# Patient Record
Sex: FEMALE | Race: WHITE | Employment: UNEMPLOYED | URBAN - METROPOLITAN AREA
[De-identification: names, ages, dates, MRNs, and addresses within clinical notes are randomized per-mention and may not be internally consistent; named-entity substitution may affect disease eponyms.]

---

## 2022-03-19 PROBLEM — C54.1 PAPILLARY SEROUS ENDOMETRIAL ADENOCARCINOMA (HCC): Status: ACTIVE | Noted: 2021-09-13

## 2023-06-08 ENCOUNTER — OFFICE VISIT (OUTPATIENT)
Age: 83
End: 2023-06-08
Payer: MEDICARE

## 2023-06-08 VITALS
DIASTOLIC BLOOD PRESSURE: 77 MMHG | HEART RATE: 61 BPM | BODY MASS INDEX: 28.57 KG/M2 | HEIGHT: 65 IN | SYSTOLIC BLOOD PRESSURE: 128 MMHG

## 2023-06-08 DIAGNOSIS — C54.1 PAPILLARY SEROUS ENDOMETRIAL ADENOCARCINOMA (HCC): Primary | ICD-10-CM

## 2023-06-08 PROCEDURE — 99213 OFFICE O/P EST LOW 20 MIN: CPT | Performed by: OBSTETRICS & GYNECOLOGY

## 2023-06-08 PROCEDURE — 1090F PRES/ABSN URINE INCON ASSESS: CPT | Performed by: OBSTETRICS & GYNECOLOGY

## 2023-06-08 PROCEDURE — G8419 CALC BMI OUT NRM PARAM NOF/U: HCPCS | Performed by: OBSTETRICS & GYNECOLOGY

## 2023-06-08 PROCEDURE — 1036F TOBACCO NON-USER: CPT | Performed by: OBSTETRICS & GYNECOLOGY

## 2023-06-08 PROCEDURE — 1123F ACP DISCUSS/DSCN MKR DOCD: CPT | Performed by: OBSTETRICS & GYNECOLOGY

## 2023-06-08 PROCEDURE — G8400 PT W/DXA NO RESULTS DOC: HCPCS | Performed by: OBSTETRICS & GYNECOLOGY

## 2023-06-08 PROCEDURE — G8427 DOCREV CUR MEDS BY ELIG CLIN: HCPCS | Performed by: OBSTETRICS & GYNECOLOGY

## 2023-06-08 NOTE — PROGRESS NOTES
[address] 
[phone] Patient: Laly Gardiner MRN: [id number] VGN:85/5/0819 My Medications STOP taking these medications   
 amLODIPine 5 mg tablet Commonly known as:  Fred Rankin TAKE these medications as instructed Instructions Each Dose to Equal  
 Morning Noon Evening Bedtime  
 acetaminophen 500 mg tablet Commonly known as:  TYLENOL Your last dose was: Your next dose is: Take 500 mg by mouth every six (6) hours as needed for Pain. 500 mg  
    
   
   
   
  
 carbidopa-levodopa  mg per tablet Commonly known as:  SINEMET Your last dose was: Your next dose is: Take 2 Tabs by mouth four (4) times daily. 2 Tab  
    
   
   
   
  
 carvedilol 3.125 mg tablet Commonly known as:  Mercedes Carlson Your last dose was: Your next dose is: Take 1 Tab by mouth two (2) times daily (with meals). 3.125 mg  
    
   
   
   
  
 docusate sodium 50 mg capsule Commonly known as:  Vijaya Wilkins Your last dose was: Your next dose is: Take 50 mg by mouth daily. 50 mg  
    
   
   
   
  
 hydrALAZINE 25 mg tablet Commonly known as:  APRESOLINE Your last dose was: Your next dose is: TAKE 1 TABLET BY MOUTH TWICE DAILY losartan 25 mg tablet Commonly known as:  COZAAR Start taking on:  12/14/2017 Your last dose was: Your next dose is: Take 0.5 Tabs by mouth daily. 12.5 mg  
    
   
   
   
  
 lovastatin 20 mg tablet Commonly known as:  MEVACOR Your last dose was: Your next dose is: TAKE 1 TABLET BY MOUTH EVERY DAY  
     
   
   
   
  
 multivitamin with iron tablet Your last dose was: Your next dose is: Take 1 Tab by mouth daily. [employer] 1 Tab [address] [place] [address] Mathias Moritz 026, 0804 [facility]  P [phone]  F [phone]    Office Note  Patient ID:  Name:  Daniel Medina  MRN:  [id number]  :  1940/83 y.o. Date:  2023      HISTORY OF PRESENT ILLNESS:  Daniel Medina is a 79 y.o.  postmenopausal female who is an established patient being seen for a diagnosis of stage IB uterine papillary serous carcinoma. She underwent robotic-assisted TLH, BSO, SPLND, omentectomy on 21. Based upon her pathology she was recommended adjuvant chemotherapy. She received adjuvant Taxol/Carboplatin chemotherapy using weekly dosing. She completed 3 cycles and did not do well with the last two cycles. She did not want to continue with chemotherapy. She was referred to radiation oncology for consultation. She completed vaginal brachytherapy in 2022. She presents today for follow-up. She is without complaints. She denies any vaginal bleeding or discharge, any pelvic or abdominal pain, or any changes in her bowel or bladder habits. ROS:   and GI review:  Negative  Cardiopulmonary review:  Negative   Musculoskeletal:  Negative     A comprehensive review of systems was negative except for that written in the History of Present Illness. 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     PMH:  Past Medical History:   Diagnosis Date    Aneurysm ([address]     thoracic; being monitored w/ CT exams    Atrial fibrillation ([address]     ELIQUIS    CAD (coronary artery disease)     Echo 2017: EF 50%, grade I dystolic dysfunction, mild MR, mild pulmonary HTN    Cancer ([address] 2021    ENDOMETRIAL    Hyperlipemia     Hypertension     Hypothyroidism       PSH:  Past Surgical History:   Procedure Laterality Date    CARDIAC CATHETERIZATION      CARDIOVERSION X2    CARDIAC CATHETERIZATION  2020 ondansetron hcl 4 mg tablet Commonly known as:  Li Hu Your last dose was: Your next dose is: Take 4 mg by mouth every eight (8) hours as needed for Nausea. 4 mg OTHER Your last dose was: Your next dose is:    
   
   
 Rollator Walker with seat icd [address] Where to Get Your Medications Information on where to get these meds will be given to you by the nurse or doctor. ! Ask your nurse or doctor about these medications  
  carvedilol 3.125 mg tablet  
 losartan 25 mg tablet

## 2023-06-08 NOTE — PROGRESS NOTES
Three month check up for history of endometrial cancer. Pt states no abnormal spotting, bleeding or pain. 1. Have you been to the ER, urgent care clinic since your last visit? Hospitalized since your last visit?no    2. Have you seen or consulted any other health care providers outside of the [address] [place] [address] since your last visit? Include any pap smears or colon screening.    no

## 2023-06-21 NOTE — PERIOP NOTE
[address] INSTRUCTIONS    Surgery Date:   6/23/23    Your surgeon's office or [facility] staff will call you between 4 PM- 8 PM the day before surgery with your arrival time. If your surgery is on a Monday, you will receive a call the preceding Friday. Please report to [address] Patient Access/Admitting on the 1st floor. Bring your insurance card, photo identification, and any copayment ( if applicable). If you are going home the same day of your surgery, you must have a responsible adult to drive you home. You need to have a responsible adult to stay with you the first 24 hours after surgery and you should not drive a car for 24 hours following your surgery. Nothing to eat or drink after midnight the night before surgery. This includes no water, gum, mints, coffee, juice, etc.  Please note special instructions, if applicable, below for medications. Do NOT drink alcohol or smoke 24 hours before surgery. STOP smoking for 14 days prior as it helps with breathing and healing after surgery. If you are being admitted to the hospital, please leave personal belongings/luggage in your car until you have an assigned hospital room number. Please wear comfortable clothes. Wear your glasses instead of contacts. We ask that all money, jewelry and valuables be left at home. Wear no make up, particularly mascara, the day of surgery. All body piercings, rings, and jewelry need to be removed and left at home. Please remove any nail polish or artificial nails from your fingernails. Please wear your hair loose or down. Please no pony-tails, buns, or any metal hair accessories. If you shower the morning of surgery, please do not apply any lotions or powders afterwards. You may wear deodorant, unless having breast surgery. Do not shave any body area within 24 hours of your surgery. Please follow all instructions to avoid any potential surgical cancellation.   Should your physical condition change,

## 2023-06-22 ENCOUNTER — ANESTHESIA EVENT (OUTPATIENT)
Facility: HOSPITAL | Age: 83
End: 2023-06-22
Payer: MEDICARE

## 2023-06-22 NOTE — H&P
reviewed. The patient has been examined. There have been no significant clinical changes since the completion of the originally dated History and Physical.  Patient identified by surgeon; surgical site was confirmed by patient and surgeon. Planned procedure again discussed. Questions invited and answered. Surgical consent signed by patient. Patient wishes to proceed with planned procedure.     Jose Rafael Todd MD  June 23, 2023  7:24 AM

## 2023-06-23 ENCOUNTER — ANESTHESIA (OUTPATIENT)
Facility: HOSPITAL | Age: 83
End: 2023-06-23
Payer: MEDICARE

## 2023-06-23 ENCOUNTER — HOSPITAL ENCOUNTER (OUTPATIENT)
Facility: HOSPITAL | Age: 83
Setting detail: OUTPATIENT SURGERY
Discharge: HOME | End: 2023-06-23
Attending: OBSTETRICS & GYNECOLOGY | Admitting: OBSTETRICS & GYNECOLOGY
Payer: MEDICARE

## 2023-06-23 VITALS
SYSTOLIC BLOOD PRESSURE: 119 MMHG | TEMPERATURE: 97.5 F | HEART RATE: 61 BPM | DIASTOLIC BLOOD PRESSURE: 71 MMHG | RESPIRATION RATE: 15 BRPM | WEIGHT: 163 LBS | HEIGHT: 65 IN | BODY MASS INDEX: 27.16 KG/M2 | OXYGEN SATURATION: 98 %

## 2023-06-23 PROCEDURE — 3600000002 HC SURGERY LEVEL 2 BASE: Performed by: OBSTETRICS & GYNECOLOGY

## 2023-06-23 PROCEDURE — 7100000010 HC PHASE II RECOVERY - FIRST 15 MIN: Performed by: OBSTETRICS & GYNECOLOGY

## 2023-06-23 PROCEDURE — 2709999900 HC NON-CHARGEABLE SUPPLY: Performed by: OBSTETRICS & GYNECOLOGY

## 2023-06-23 PROCEDURE — 3700000001 HC ADD 15 MINUTES (ANESTHESIA): Performed by: OBSTETRICS & GYNECOLOGY

## 2023-06-23 PROCEDURE — 7100000000 HC PACU RECOVERY - FIRST 15 MIN: Performed by: OBSTETRICS & GYNECOLOGY

## 2023-06-23 PROCEDURE — 2580000003 HC RX 258: Performed by: PHYSICIAN ASSISTANT

## 2023-06-23 PROCEDURE — 7100000011 HC PHASE II RECOVERY - ADDTL 15 MIN: Performed by: OBSTETRICS & GYNECOLOGY

## 2023-06-23 PROCEDURE — 3600000012 HC SURGERY LEVEL 2 ADDTL 15MIN: Performed by: OBSTETRICS & GYNECOLOGY

## 2023-06-23 PROCEDURE — 6360000002 HC RX W HCPCS: Performed by: PHYSICIAN ASSISTANT

## 2023-06-23 PROCEDURE — 2500000003 HC RX 250 WO HCPCS: Performed by: OBSTETRICS & GYNECOLOGY

## 2023-06-23 PROCEDURE — 6370000000 HC RX 637 (ALT 250 FOR IP): Performed by: ANESTHESIOLOGY

## 2023-06-23 PROCEDURE — 2580000003 HC RX 258: Performed by: ANESTHESIOLOGY

## 2023-06-23 PROCEDURE — 6360000002 HC RX W HCPCS

## 2023-06-23 PROCEDURE — 3700000000 HC ANESTHESIA ATTENDED CARE: Performed by: OBSTETRICS & GYNECOLOGY

## 2023-06-23 PROCEDURE — 2500000003 HC RX 250 WO HCPCS

## 2023-06-23 PROCEDURE — 7100000001 HC PACU RECOVERY - ADDTL 15 MIN: Performed by: OBSTETRICS & GYNECOLOGY

## 2023-06-23 RX ORDER — ONDANSETRON 2 MG/ML
4 INJECTION INTRAMUSCULAR; INTRAVENOUS
Status: DISCONTINUED | OUTPATIENT
Start: 2023-06-23 | End: 2023-06-23 | Stop reason: HOSPADM

## 2023-06-23 RX ORDER — SODIUM CHLORIDE 0.9 % (FLUSH) 0.9 %
5-40 SYRINGE (ML) INJECTION EVERY 12 HOURS SCHEDULED
Status: DISCONTINUED | OUTPATIENT
Start: 2023-06-23 | End: 2023-06-23 | Stop reason: HOSPADM

## 2023-06-23 RX ORDER — SODIUM CHLORIDE 9 MG/ML
INJECTION, SOLUTION INTRAVENOUS PRN
Status: DISCONTINUED | OUTPATIENT
Start: 2023-06-23 | End: 2023-06-23 | Stop reason: HOSPADM

## 2023-06-23 RX ORDER — LIDOCAINE HYDROCHLORIDE 10 MG/ML
1 INJECTION, SOLUTION EPIDURAL; INFILTRATION; INTRACAUDAL; PERINEURAL
Status: DISCONTINUED | OUTPATIENT
Start: 2023-06-23 | End: 2023-06-23 | Stop reason: HOSPADM

## 2023-06-23 RX ORDER — LIDOCAINE HYDROCHLORIDE 20 MG/ML
INJECTION, SOLUTION EPIDURAL; INFILTRATION; INTRACAUDAL; PERINEURAL PRN
Status: DISCONTINUED | OUTPATIENT
Start: 2023-06-23 | End: 2023-06-23 | Stop reason: SDUPTHER

## 2023-06-23 RX ORDER — OXYCODONE HYDROCHLORIDE 5 MG/1
5 TABLET ORAL
Status: DISCONTINUED | OUTPATIENT
Start: 2023-06-23 | End: 2023-06-23 | Stop reason: HOSPADM

## 2023-06-23 RX ORDER — ACETAMINOPHEN 500 MG
1000 TABLET ORAL ONCE
Status: DISCONTINUED | OUTPATIENT
Start: 2023-06-23 | End: 2023-06-23 | Stop reason: HOSPADM

## 2023-06-23 RX ORDER — MIDAZOLAM HYDROCHLORIDE 2 MG/2ML
2 INJECTION, SOLUTION INTRAMUSCULAR; INTRAVENOUS
Status: DISCONTINUED | OUTPATIENT
Start: 2023-06-23 | End: 2023-06-23 | Stop reason: HOSPADM

## 2023-06-23 RX ORDER — ONDANSETRON 2 MG/ML
INJECTION INTRAMUSCULAR; INTRAVENOUS PRN
Status: DISCONTINUED | OUTPATIENT
Start: 2023-06-23 | End: 2023-06-23 | Stop reason: SDUPTHER

## 2023-06-23 RX ORDER — FENTANYL CITRATE 50 UG/ML
INJECTION, SOLUTION INTRAMUSCULAR; INTRAVENOUS PRN
Status: DISCONTINUED | OUTPATIENT
Start: 2023-06-23 | End: 2023-06-23 | Stop reason: SDUPTHER

## 2023-06-23 RX ORDER — HYDROMORPHONE HYDROCHLORIDE 1 MG/ML
0.5 INJECTION, SOLUTION INTRAMUSCULAR; INTRAVENOUS; SUBCUTANEOUS EVERY 5 MIN PRN
Status: DISCONTINUED | OUTPATIENT
Start: 2023-06-23 | End: 2023-06-23 | Stop reason: HOSPADM

## 2023-06-23 RX ORDER — FENTANYL CITRATE 50 UG/ML
100 INJECTION, SOLUTION INTRAMUSCULAR; INTRAVENOUS
Status: DISCONTINUED | OUTPATIENT
Start: 2023-06-23 | End: 2023-06-23 | Stop reason: HOSPADM

## 2023-06-23 RX ORDER — SODIUM CHLORIDE 0.9 % (FLUSH) 0.9 %
5-40 SYRINGE (ML) INJECTION PRN
Status: DISCONTINUED | OUTPATIENT
Start: 2023-06-23 | End: 2023-06-23 | Stop reason: HOSPADM

## 2023-06-23 RX ORDER — PROCHLORPERAZINE EDISYLATE 5 MG/ML
5 INJECTION INTRAMUSCULAR; INTRAVENOUS
Status: DISCONTINUED | OUTPATIENT
Start: 2023-06-23 | End: 2023-06-23 | Stop reason: HOSPADM

## 2023-06-23 RX ORDER — SODIUM CHLORIDE, SODIUM LACTATE, POTASSIUM CHLORIDE, CALCIUM CHLORIDE 600; 310; 30; 20 MG/100ML; MG/100ML; MG/100ML; MG/100ML
INJECTION, SOLUTION INTRAVENOUS CONTINUOUS
Status: DISCONTINUED | OUTPATIENT
Start: 2023-06-23 | End: 2023-06-23 | Stop reason: HOSPADM

## 2023-06-23 RX ORDER — FENTANYL CITRATE 50 UG/ML
25 INJECTION, SOLUTION INTRAMUSCULAR; INTRAVENOUS EVERY 5 MIN PRN
Status: DISCONTINUED | OUTPATIENT
Start: 2023-06-23 | End: 2023-06-23 | Stop reason: HOSPADM

## 2023-06-23 RX ORDER — PROPOFOL 10 MG/ML
INJECTION, EMULSION INTRAVENOUS PRN
Status: DISCONTINUED | OUTPATIENT
Start: 2023-06-23 | End: 2023-06-23 | Stop reason: SDUPTHER

## 2023-06-23 RX ORDER — HYDRALAZINE HYDROCHLORIDE 20 MG/ML
10 INJECTION INTRAMUSCULAR; INTRAVENOUS
Status: DISCONTINUED | OUTPATIENT
Start: 2023-06-23 | End: 2023-06-23 | Stop reason: HOSPADM

## 2023-06-23 RX ADMIN — PROPOFOL 50 MCG/KG/MIN: 10 INJECTION, EMULSION INTRAVENOUS at 08:50

## 2023-06-23 RX ADMIN — ONDANSETRON HYDROCHLORIDE 4 MG: 2 INJECTION, SOLUTION INTRAMUSCULAR; INTRAVENOUS at 09:08

## 2023-06-23 RX ADMIN — PROPOFOL 40 MG: 10 INJECTION, EMULSION INTRAVENOUS at 08:50

## 2023-06-23 RX ADMIN — SODIUM CHLORIDE, POTASSIUM CHLORIDE, SODIUM LACTATE AND CALCIUM CHLORIDE: 600; 310; 30; 20 INJECTION, SOLUTION INTRAVENOUS at 08:07

## 2023-06-23 RX ADMIN — CEFOXITIN SODIUM 2000 MG: 2 POWDER, FOR SOLUTION INTRAVENOUS at 08:54

## 2023-06-23 RX ADMIN — FENTANYL CITRATE 25 MCG: 50 INJECTION, SOLUTION INTRAMUSCULAR; INTRAVENOUS at 09:00

## 2023-06-23 RX ADMIN — LIDOCAINE HYDROCHLORIDE 40 MG: 20 INJECTION, SOLUTION EPIDURAL; INFILTRATION; INTRACAUDAL; PERINEURAL at 08:50

## 2023-06-23 ASSESSMENT — PAIN - FUNCTIONAL ASSESSMENT: PAIN_FUNCTIONAL_ASSESSMENT: NONE - DENIES PAIN

## 2023-06-23 NOTE — BRIEF OP NOTE
Brief Postoperative Note      Patient: Jaleel Smith  YOB: 1940  MRN: [id number]    Date of Procedure: 6/23/2023    Pre-Op Diagnosis Codes:     * Endometrial cancer ([facility] [C54.1]    Post-Op Diagnosis: Post-Op Diagnosis Codes:     * Endometrial cancer ([facility] [C54.1]       Procedure(s):  REMOVAL INTRAVENOUS PORT    Surgeon(s):  Paty Bardales MD    Assistant:  * No surgical staff found *    Anesthesia: Monitor Anesthesia Care    Estimated Blood Loss (mL): Minimal    Complications: None    Specimens:   * No specimens in log *    Implants:  Implant Name Type Inv. Item Serial No.  Lot No. LRB No. Used Action   (HISTORICAL) PORT ATTACH CATH POWERPORT 8FR --  - SN/A   N/A [employer] [id number] Left 1 Explanted         Drains: * No LDAs found *    Findings: Port removed without difficulty.       Electronically signed by Fernando Onofre MD on 6/23/2023 at 9:13 AM

## 2023-06-23 NOTE — ANESTHESIA POSTPROCEDURE EVALUATION
Department of Anesthesiology  Postprocedure Note    Patient: Robert Giraldo  MRN: [id number]  YOB: 1940  Date of evaluation: 6/23/2023      Procedure Summary     Date: 06/23/23 Room / Location: [address] [place] [address] OR 20 / [address] [place] [address] OR    Anesthesia Start: 0845 Anesthesia Stop: 1273    Procedure: REMOVAL INTRAVENOUS PORT (Left: Chest) Diagnosis:       Endometrial cancer ([address]      (Endometrial cancer ([address] [C54.1])    Providers: Dexter Bauman MD Responsible Provider: Reggie Hu MD    Anesthesia Type: MAC ASA Status: 3          Anesthesia Type: MAC    Sindhu Phase I: Sindhu Score: 10    Sindhu Phase II: Sindhu Score: 10      Anesthesia Post Evaluation    Patient location during evaluation: PACU  Patient participation: complete - patient participated  Level of consciousness: awake  Airway patency: patent  Nausea & Vomiting: no nausea  Complications: no  Cardiovascular status: blood pressure returned to baseline and hemodynamically stable  Respiratory status: acceptable  Hydration status: stable  Multimodal analgesia pain management approach

## 2023-06-23 NOTE — OP NOTE
Gynecologic Oncology Operative Report    Robert Giraldo  6/23/2023    Pre-operative diagnosis:  Endometrial CA, no longer requires venous access for chemotherapy     Post-operative diagnosis:  Endometrial CA, no longer requires venous access for chemotherapy    Procedure:  Intravenous port removal    Surgeon:  Madhuri Atkinson MD    Assistant:  N/A    Anesthesia:  MAC, plus local    EBL:  Minimal    Implants:  Implant Name Type Inv. Item Serial No.  Lot No. LRB No. Used Action   (HISTORICAL) PORT ATTACH CATH POWERPORT 8FR --  - SN/A   N/A [employer]WD [id number] Left 1 Explanted     Complications:  None    Operative indications:  78 yo WF with history of endometrial cancer. She has no evidence of disease and wishes to have her IV port removed. Operative Findings:  Port removed without difficulty. Procedure in detail:  After the risks, benefits, indications, and alternatives of the procedure were discussed with the patient and informed consent was obtained, the patient was taken to the operating room. She was then correctly identified, administered IV sedation per anesthesia, and then prepped and draped in the supine position in the usual fashion. Her arms were also tucked at each side. The anatomy of the anterior thoracic wall was noted. 1% lidocaine without epinephrine was then injected along the previous incision overlying the port. A skin incision was then made transversely with a #15-blade scalpel in the same location. The incision was then carried down to the the level of the port with electro-cautery. The port was then grasped with a Kocher clamp and manipulated so that the Proline sutures securing it in place could be cut and removed. The port and catheter were then removed. The catheter tunnel was ligated with a figure-of-eight of 3-0 Vicryl suture. The port pocket was then irrigated and made hemostatic with electro-cautery. The incision was then closed in two layers.

## 2023-06-23 NOTE — PERIOP NOTE
Preoperative assessment completed by SHANON Burton. Patient verbalized understanding of surgical plan and consent reviewed. Allergies verified. NPO status confirmed. Intraoperative education given by RN. Opportunity given to ask questions.

## 2023-06-23 NOTE — ANESTHESIA PRE PROCEDURE
Department of Anesthesiology  Preprocedure Note       Name:  Eduardo Blackwood   Age:  79 y.o.  :  1940                                          MRN:  [id number]         Date:  2023      Surgeon: Joon Velazquez):  Ariela Rsos MD    Procedure: Procedure(s):  REMOVAL INTRAVENOUS PORT    Medications prior to admission:   Prior to Admission medications    Medication Sig Start Date End Date Taking?  Authorizing Provider   apixaban (ELIQUIS) 5 MG TABS tablet Take 1 tablet by mouth 2 times daily    Ar Automatic Reconciliation   atenolol (TENORMIN) 25 MG tablet Take 1 tablet by mouth daily    Ar Automatic Reconciliation   cyanocobalamin 500 MCG tablet Take 1 tablet by mouth daily    Ar Automatic Reconciliation   levothyroxine (SYNTHROID) 100 MCG tablet Take 88 mcg by mouth every morning (before breakfast)    Ar Automatic Reconciliation   pravastatin (PRAVACHOL) 20 MG tablet Take 1 tablet by mouth nightly    Ar Automatic Reconciliation   triamterene-hydroCHLOROthiazide (MAXZIDE-25) 37.5-25 MG per tablet Take 0.5 tablets by mouth daily    Ar Automatic Reconciliation       Current medications:    Current Facility-Administered Medications   Medication Dose Route Frequency Provider Last Rate Last Admin    sodium chloride flush 0.9 % injection 5-40 mL  5-40 mL IntraVENous 2 times per day Anne ARGENTINA Gallegos-FRANDY        sodium chloride flush 0.9 % injection 5-40 mL  5-40 mL IntraVENous PRN Anne El PA-C        0.9 % sodium chloride infusion   IntraVENous PRN Annegood Gallegos PA-C        cefOXitin (MEFOXIN) 2,000 mg in sterile water 20 mL IV syringe  2,000 mg IntraVENous On Call to [facility] MARY Boo        lidocaine PF 1 % injection 1 mL  1 mL IntraDERmal Once PRN Bonny Merlin, MD        acetaminophen (TYLENOL) tablet 1,000 mg  1,000 mg Oral Once Bonny Merlin, MD        fentaNYL (SUBLIMAZE) injection 100 mcg  100 mcg IntraVENous Once PRN Bonny Merlin, MD        lactated ringers IV soln infusion   IntraVENous

## 2023-07-05 ENCOUNTER — APPOINTMENT (OUTPATIENT)
Dept: INFUSION THERAPY | Age: 83
End: 2023-07-05

## 2023-07-24 ENCOUNTER — HOSPITAL ENCOUNTER (OUTPATIENT)
Facility: HOSPITAL | Age: 83
Discharge: HOME | End: 2023-07-27

## 2023-08-30 ENCOUNTER — APPOINTMENT (OUTPATIENT)
Dept: INFUSION THERAPY | Age: 83
End: 2023-08-30

## 2023-12-06 NOTE — PROGRESS NOTES
Six month check up. Pt states no abnormal spotting, bleeding or pain. 1. Have you been to the ER, urgent care clinic since your last visit? Hospitalized since your last visit?no  2. Have you seen or consulted any other health care providers outside of the [address] [place] [address] since your last visit? Include any pap smears or colon screening.  no

## 2023-12-07 ENCOUNTER — OFFICE VISIT (OUTPATIENT)
Age: 83
End: 2023-12-07
Payer: MEDICARE

## 2023-12-07 VITALS
HEART RATE: 61 BPM | HEIGHT: 65 IN | BODY MASS INDEX: 27.99 KG/M2 | WEIGHT: 168 LBS | SYSTOLIC BLOOD PRESSURE: 117 MMHG | DIASTOLIC BLOOD PRESSURE: 76 MMHG

## 2023-12-07 DIAGNOSIS — C54.1 PAPILLARY SEROUS ENDOMETRIAL ADENOCARCINOMA (HCC): Primary | ICD-10-CM

## 2023-12-07 PROCEDURE — 1123F ACP DISCUSS/DSCN MKR DOCD: CPT | Performed by: OBSTETRICS & GYNECOLOGY

## 2023-12-07 PROCEDURE — G8419 CALC BMI OUT NRM PARAM NOF/U: HCPCS | Performed by: OBSTETRICS & GYNECOLOGY

## 2023-12-07 PROCEDURE — 1090F PRES/ABSN URINE INCON ASSESS: CPT | Performed by: OBSTETRICS & GYNECOLOGY

## 2023-12-07 PROCEDURE — G8427 DOCREV CUR MEDS BY ELIG CLIN: HCPCS | Performed by: OBSTETRICS & GYNECOLOGY

## 2023-12-07 PROCEDURE — 99212 OFFICE O/P EST SF 10 MIN: CPT | Performed by: OBSTETRICS & GYNECOLOGY

## 2023-12-07 PROCEDURE — G8484 FLU IMMUNIZE NO ADMIN: HCPCS | Performed by: OBSTETRICS & GYNECOLOGY

## 2023-12-07 PROCEDURE — G8400 PT W/DXA NO RESULTS DOC: HCPCS | Performed by: OBSTETRICS & GYNECOLOGY

## 2023-12-07 PROCEDURE — 1036F TOBACCO NON-USER: CPT | Performed by: OBSTETRICS & GYNECOLOGY

## 2023-12-07 NOTE — PROGRESS NOTES
[address] [place] [address] Ching Bui  P [phone]  F [phone]    Office Note  Patient ID:  Name:  Ana Cristina Villareal  MRN:  [id number]  :  1940/83 y.o. Date:  2023      HISTORY OF PRESENT ILLNESS:  Ana Cristina Villareal is a 79 y.o.  postmenopausal female who is an established patient being seen for a diagnosis of stage IB uterine papillary serous carcinoma. She underwent robotic-assisted TLH, BSO, SPLND, omentectomy on 21. Based upon her pathology she was recommended adjuvant chemotherapy. She received adjuvant Taxol/Carboplatin chemotherapy using weekly dosing. She completed 3 cycles and did not do well with the last two cycles. She did not want to continue with chemotherapy. She was referred to radiation oncology for consultation. She completed vaginal brachytherapy in 2022. She presents today for follow-up. She is without complaints. She denies any vaginal bleeding or discharge, any pelvic or abdominal pain, or any changes in her bowel or bladder habits. ROS:   and GI review:  Negative  Cardiopulmonary review:  Negative   Musculoskeletal:  Negative     A comprehensive review of systems was negative except for that written in the History of Present Illness. , 10 point ROS      Problem List:  Patient Active Problem List    Diagnosis Date Noted    Papillary serous endometrial adenocarcinoma ([address]) 2021     PMH:  Past Medical History:   Diagnosis Date    Aneurysm ([address])     thoracic; being monitored w/ CT exams    Atrial fibrillation ([address])     ELIQUIS    CAD (coronary artery disease)     Echo 2017: EF 84%, grade I dystolic dysfunction, mild MR, mild pulmonary HTN    Cancer ([address]) 2021    ENDOMETRIAL    History of blood transfusion         Hyperlipemia     Hypertension     Hypothyroidism       PSH:  Past Surgical History:   Procedure Laterality Date    CARDIAC CATHETERIZATION  2018    CARDIOVERSION

## 2023-12-08 ENCOUNTER — TELEPHONE (OUTPATIENT)
Age: 83
End: 2023-12-08

## 2023-12-08 NOTE — TELEPHONE ENCOUNTER
I called and spoke with grand daughter, she states that patient was seen by Dr. Abraham Ariza yesterday, she had some spotting last evening, but this morning she is having bright red blood \"that is oozing from somewhere\", grand daughter is an OBGYN nurse, she states she is filling a pad and she is concerned since patient is on a blood thinner, please call

## 2023-12-08 NOTE — TELEPHONE ENCOUNTER
Pt's granddaughter called stating that pt has been bleeding since her exam yesterday. She's concerned because pt is on blood thinners.

## 2024-06-11 ENCOUNTER — OFFICE VISIT (OUTPATIENT)
Age: 84
End: 2024-06-11
Payer: MEDICARE

## 2024-06-11 VITALS
BODY MASS INDEX: 27.96 KG/M2 | HEART RATE: 69 BPM | DIASTOLIC BLOOD PRESSURE: 76 MMHG | SYSTOLIC BLOOD PRESSURE: 116 MMHG | HEIGHT: 65 IN

## 2024-06-11 DIAGNOSIS — C54.1 PAPILLARY SEROUS ENDOMETRIAL ADENOCARCINOMA (HCC): Primary | ICD-10-CM

## 2024-06-11 PROCEDURE — 99212 OFFICE O/P EST SF 10 MIN: CPT | Performed by: OBSTETRICS & GYNECOLOGY

## 2024-06-11 PROCEDURE — G8427 DOCREV CUR MEDS BY ELIG CLIN: HCPCS | Performed by: OBSTETRICS & GYNECOLOGY

## 2024-06-11 PROCEDURE — G8400 PT W/DXA NO RESULTS DOC: HCPCS | Performed by: OBSTETRICS & GYNECOLOGY

## 2024-06-11 PROCEDURE — 1090F PRES/ABSN URINE INCON ASSESS: CPT | Performed by: OBSTETRICS & GYNECOLOGY

## 2024-06-11 PROCEDURE — 1036F TOBACCO NON-USER: CPT | Performed by: OBSTETRICS & GYNECOLOGY

## 2024-06-11 PROCEDURE — 1123F ACP DISCUSS/DSCN MKR DOCD: CPT | Performed by: OBSTETRICS & GYNECOLOGY

## 2024-06-11 PROCEDURE — G8419 CALC BMI OUT NRM PARAM NOF/U: HCPCS | Performed by: OBSTETRICS & GYNECOLOGY

## 2024-06-11 NOTE — PROGRESS NOTES
Six month check up for history of endometrial cancer. Pt states no abnormal spotting, bleeding or pain.   1. Have you been to the ER, urgent care clinic since your last visit?  Hospitalized since your last visit?no  2. Have you seen or consulted any other health care providers outside of the [facility] since your last visit?  Include any pap smears or colon screening. no  
X2    CARDIAC CATHETERIZATION  06/2020    ABLATION (DR MAILE MUHAMMAD)    CATARACT REMOVAL Bilateral     COLONOSCOPY      FOOT NEUROMA SURGERY      HYSTERECTOMY (CERVIX STATUS UNKNOWN)  09/2021    PORT SURGERY Left 6/23/2023    REMOVAL INTRAVENOUS PORT performed by Carter Wan Jr., MD at [facility] MAIN OR    UPPER GASTROINTESTINAL ENDOSCOPY        Social History:  Social History     Tobacco Use    Smoking status: Never    Smokeless tobacco: Never   Substance Use Topics    Alcohol use: Not Currently      Family History:  Family History   Problem Relation Age of Onset    Cancer Mother         ESOPHAGEAL    Heart Disease Mother     Stroke Father         TIA    Cancer Sister         COLON    Diabetes Sister     Rheum Arthritis Sister     Mult Sclerosis Brother     Heart Disease Brother         IRREG HB    Pacemaker Brother     Heart Disease Brother         A FIB    Anesth Problems Neg Hx       Medications: (reviewed)  Current Outpatient Medications   Medication Sig    apixaban (ELIQUIS) 5 MG TABS tablet Take 1 tablet by mouth 2 times daily    atenolol (TENORMIN) 25 MG tablet Take 1 tablet by mouth daily    cyanocobalamin 500 MCG tablet Take 1 tablet by mouth daily    levothyroxine (SYNTHROID) 100 MCG tablet Take 88 mcg by mouth every morning (before breakfast)    pravastatin (PRAVACHOL) 20 MG tablet Take 1 tablet by mouth nightly    triamterene-hydroCHLOROthiazide (MAXZIDE-25) 37.5-25 MG per tablet Take 0.5 tablets by mouth daily     No current facility-administered medications for this visit.     Allergies: (reviewed)  No Known Allergies       OBJECTIVE:    Physical Exam:  VITAL SIGNS: Vitals:    06/11/24 1007   BP: 116/76   Site: Right Upper Arm   Position: Sitting   Pulse: 69   Height: 1.651 m (5' 5\")         Body mass index is 27.96 kg/m².   GENERAL DANITA: Conversant, alert, oriented. No acute distress.   HEENT: HEENT. No thyroid enlargement. No JVD.   Neck: Supple without restrictions.   RESPIRATORY: Clear to auscultation and

## 2025-07-01 ENCOUNTER — OFFICE VISIT (OUTPATIENT)
Age: 85
End: 2025-07-01
Payer: MEDICARE

## 2025-07-01 VITALS
HEIGHT: 65 IN | DIASTOLIC BLOOD PRESSURE: 69 MMHG | SYSTOLIC BLOOD PRESSURE: 109 MMHG | WEIGHT: 150 LBS | BODY MASS INDEX: 24.99 KG/M2 | HEART RATE: 75 BPM

## 2025-07-01 DIAGNOSIS — C54.1 PAPILLARY SEROUS ENDOMETRIAL ADENOCARCINOMA (HCC): Primary | ICD-10-CM

## 2025-07-01 PROCEDURE — G8420 CALC BMI NORM PARAMETERS: HCPCS | Performed by: OBSTETRICS & GYNECOLOGY

## 2025-07-01 PROCEDURE — 99212 OFFICE O/P EST SF 10 MIN: CPT | Performed by: OBSTETRICS & GYNECOLOGY

## 2025-07-01 PROCEDURE — 1090F PRES/ABSN URINE INCON ASSESS: CPT | Performed by: OBSTETRICS & GYNECOLOGY

## 2025-07-01 PROCEDURE — 1160F RVW MEDS BY RX/DR IN RCRD: CPT | Performed by: OBSTETRICS & GYNECOLOGY

## 2025-07-01 PROCEDURE — 1126F AMNT PAIN NOTED NONE PRSNT: CPT | Performed by: OBSTETRICS & GYNECOLOGY

## 2025-07-01 PROCEDURE — G8427 DOCREV CUR MEDS BY ELIG CLIN: HCPCS | Performed by: OBSTETRICS & GYNECOLOGY

## 2025-07-01 PROCEDURE — 1123F ACP DISCUSS/DSCN MKR DOCD: CPT | Performed by: OBSTETRICS & GYNECOLOGY

## 2025-07-01 PROCEDURE — 1036F TOBACCO NON-USER: CPT | Performed by: OBSTETRICS & GYNECOLOGY

## 2025-07-01 PROCEDURE — 1159F MED LIST DOCD IN RCRD: CPT | Performed by: OBSTETRICS & GYNECOLOGY

## 2025-07-01 RX ORDER — CLOBETASOL PROPIONATE 0.5 MG/G
OINTMENT TOPICAL
Qty: 60 G | Refills: 5 | Status: SHIPPED | OUTPATIENT
Start: 2025-07-01

## 2025-07-01 NOTE — PROGRESS NOTES
Check up. Pt states no abnormal spotting, bleeding or pain.  1. Have you been to the ER, urgent care clinic since your last visit?  Hospitalized since your last visit?no    2. Have you seen or consulted any other health care providers outside of the [facility] since your last visit?  Include any pap smears or colon screening. no

## 2025-07-01 NOTE — PROGRESS NOTES
[facility] GYNECOLOGIC ONCOLOGY  [address] [place] [address], [facility]  [place] [address]  P [phone]  F [phone]    Office Note  Patient ID:  Name:  Shobha Reid  MRN:  [id number]  :  1940/85 y.o.  Date:  2025      HISTORY OF PRESENT ILLNESS:  Shobha Reid is a 85 y.o.  postmenopausal female who is an established patient being seen for a diagnosis of stage IB uterine papillary serous carcinoma.  She underwent robotic-assisted TLH, BSO, SPLND, omentectomy on 21.  Based upon her pathology she was recommended adjuvant chemotherapy.       She received adjuvant Taxol/Carboplatin chemotherapy using weekly dosing.  She completed 3 cycles and did not do well with the last two cycles.  She did not want to continue with chemotherapy.  She was referred to radiation oncology for consultation.  She completed vaginal brachytherapy in 2022.       She presents today for follow-up.  She is without complaints.  She denies any vaginal bleeding or discharge, any pelvic or abdominal pain, or any changes in her bowel or bladder habits.         ROS:   and GI review:  Negative  Cardiopulmonary review:  Negative   Musculoskeletal:  Negative     A comprehensive review of systems was negative except for that written in the History of Present Illness., 10 point ROS      Problem List:  Patient Active Problem List    Diagnosis Date Noted    Papillary serous endometrial adenocarcinoma ([facility]) 2021     PMH:  Past Medical History:   Diagnosis Date    Aneurysm     thoracic; being monitored w/ CT exams    Atrial fibrillation ([facility])     ELIQUIS    CAD (coronary artery disease)     Echo 2017: EF 55%, grade I dystolic dysfunction, mild MR, mild pulmonary HTN    Cancer ([facility]) 2021    ENDOMETRIAL    History of blood transfusion         Hyperlipemia     Hypertension     Hypothyroidism       PSH:  Past Surgical History:   Procedure Laterality Date    CARDIAC CATHETERIZATION  2018    CARDIOVERSION X2

## 2025-07-02 LAB — CANCER AG125 SERPL-ACNC: 12.7 U/ML (ref 0–38.1)

## (undated) DEVICE — LIQUIBAND RAPID ADHESIVE 36/CS 0.8ML: Brand: MEDLINE

## (undated) DEVICE — GLOVE SURG SZ 75 L1212IN FNGR THK138MIL BRN LTX FREE

## (undated) DEVICE — APPLICATOR MEDICATED 26 CC SOLUTION HI LT ORNG CHLORAPREP

## (undated) DEVICE — DRAPE,LAPAROTOMY,T,PEDI,STERILE: Brand: MEDLINE

## (undated) DEVICE — SUTURE MCRYL SZ 4-0 L27IN ABSRB UD L19MM PS-2 1/2 CIR PRIM Y426H

## (undated) DEVICE — HYPODERMIC SAFETY NEEDLE: Brand: MONOJECT

## (undated) DEVICE — GARMENT,MEDLINE,DVT,INT,CALF,MED, GEN2: Brand: MEDLINE

## (undated) DEVICE — SUTURE VCRL SZ 3-0 L27IN ABSRB UD L26MM SH 1/2 CIR J416H

## (undated) DEVICE — MINOR BASIN -SMH: Brand: MEDLINE INDUSTRIES, INC.

## (undated) DEVICE — ELECTRODE PT RET AD L9FT HI MOIST COND ADH HYDRGEL CORDED

## (undated) DEVICE — COVER LT HNDL PLAS RIG 1 PER PK